# Patient Record
Sex: MALE | Race: WHITE | NOT HISPANIC OR LATINO | Employment: FULL TIME | URBAN - METROPOLITAN AREA
[De-identification: names, ages, dates, MRNs, and addresses within clinical notes are randomized per-mention and may not be internally consistent; named-entity substitution may affect disease eponyms.]

---

## 2021-12-21 ENCOUNTER — HISTORICAL (OUTPATIENT)
Dept: ADMINISTRATIVE | Facility: HOSPITAL | Age: 51
End: 2021-12-21

## 2022-08-15 ENCOUNTER — OFFICE VISIT (OUTPATIENT)
Dept: URGENT CARE | Facility: CLINIC | Age: 52
End: 2022-08-15

## 2022-08-15 VITALS
OXYGEN SATURATION: 97 % | BODY MASS INDEX: 27.44 KG/M2 | RESPIRATION RATE: 18 BRPM | HEIGHT: 71 IN | WEIGHT: 196 LBS | HEART RATE: 89 BPM | DIASTOLIC BLOOD PRESSURE: 85 MMHG | SYSTOLIC BLOOD PRESSURE: 124 MMHG | TEMPERATURE: 99 F

## 2022-08-15 DIAGNOSIS — J02.0 STREP PHARYNGITIS: ICD-10-CM

## 2022-08-15 DIAGNOSIS — J02.9 SORE THROAT: Primary | ICD-10-CM

## 2022-08-15 LAB
CTP QC/QA: YES
S PYO RRNA THROAT QL PROBE: POSITIVE

## 2022-08-15 PROCEDURE — 87880 STREP A ASSAY W/OPTIC: CPT | Mod: QW,,, | Performed by: PHYSICIAN ASSISTANT

## 2022-08-15 PROCEDURE — 99203 PR OFFICE/OUTPT VISIT, NEW, LEVL III, 30-44 MIN: ICD-10-PCS | Mod: 25,,, | Performed by: PHYSICIAN ASSISTANT

## 2022-08-15 PROCEDURE — 99203 OFFICE O/P NEW LOW 30 MIN: CPT | Mod: 25,,, | Performed by: PHYSICIAN ASSISTANT

## 2022-08-15 PROCEDURE — 87880 POCT RAPID STREP A: ICD-10-PCS | Mod: QW,,, | Performed by: PHYSICIAN ASSISTANT

## 2022-08-15 RX ORDER — AMOXICILLIN 875 MG/1
875 TABLET, FILM COATED ORAL 2 TIMES DAILY
Qty: 20 TABLET | Refills: 0 | Status: SHIPPED | OUTPATIENT
Start: 2022-08-15 | End: 2022-08-25

## 2022-08-15 NOTE — PROGRESS NOTES
"Subjective:       Patient ID: Rah Sotelo is a 52 y.o. male.    Vitals:  height is 5' 10.5" (1.791 m) and weight is 88.9 kg (196 lb). His oral temperature is 98.5 °F (36.9 °C). His blood pressure is 124/85 and his pulse is 89. His respiration is 18 and oxygen saturation is 97%.     Chief Complaint: tongue irritation    Tongue and throat irritation, red bumps on palate of mouth x1 week.  Reports mild nasal congestion but denies any fever cough neck stiffness rash or GI symptoms.    ROS    Objective:      Physical Exam   Constitutional: He is oriented to person, place, and time. He appears well-developed. He is cooperative.  Non-toxic appearance. He does not appear ill. No distress.   HENT:   Head: Normocephalic and atraumatic.   Ears:   Right Ear: Hearing, tympanic membrane, external ear and ear canal normal.   Left Ear: Hearing, tympanic membrane, external ear and ear canal normal.   Nose: Nose normal. No mucosal edema, rhinorrhea or nasal deformity. No epistaxis. Right sinus exhibits no maxillary sinus tenderness and no frontal sinus tenderness. Left sinus exhibits no maxillary sinus tenderness and no frontal sinus tenderness.   Mouth/Throat: Uvula is midline and mucous membranes are normal. No trismus in the jaw. Normal dentition. No uvula swelling. Posterior oropharyngeal erythema present. No oropharyngeal exudate or posterior oropharyngeal edema.   Eyes: Conjunctivae and lids are normal. No scleral icterus.   Neck: Trachea normal and phonation normal. Neck supple. No edema present. No erythema present. No neck rigidity present.   Cardiovascular: Normal rate, regular rhythm, normal heart sounds and normal pulses.   Pulmonary/Chest: Effort normal and breath sounds normal. No respiratory distress. He has no decreased breath sounds. He has no rhonchi.   Abdominal: Normal appearance.   Musculoskeletal: Normal range of motion.         General: No deformity. Normal range of motion.   Neurological: He is alert and " "oriented to person, place, and time. He exhibits normal muscle tone. Coordination normal.   Skin: Skin is warm, dry, intact, not diaphoretic and not pale.   Psychiatric: His speech is normal and behavior is normal. Judgment and thought content normal.   Nursing note and vitals reviewed.  Soft palate petechiae present         Previous History      Review of patient's allergies indicates:  No Known Allergies    History reviewed. No pertinent past medical history.  Current Outpatient Medications   Medication Instructions    amoxicillin (AMOXIL) 875 mg, Oral, 2 times daily     Past Surgical History:   Procedure Laterality Date    HERNIA REPAIR       Family History   Family history unknown: Yes       Social History     Tobacco Use    Smoking status: Never Smoker    Smokeless tobacco: Never Used   Substance Use Topics    Alcohol use: Yes    Drug use: Never        Physical Exam      Vital Signs Reviewed   /85   Pulse 89   Temp 98.5 °F (36.9 °C) (Oral)   Resp 18   Ht 5' 10.5" (1.791 m)   Wt 88.9 kg (196 lb)   SpO2 97%   BMI 27.73 kg/m²        Procedures    Procedures     Labs     Results for orders placed or performed in visit on 08/15/22   POCT rapid strep A   Result Value Ref Range    Rapid Strep A Screen Positive (A) Negative     Acceptable Yes          Assessment:       1. Sore throat    2. Strep pharyngitis          Plan:         Sore throat  -     POCT rapid strep A    Strep pharyngitis  -     amoxicillin (AMOXIL) 875 MG tablet; Take 1 tablet (875 mg total) by mouth 2 (two) times daily. for 10 days  Dispense: 20 tablet; Refill: 0    Drink plenty of fluids    Get plenty of rest.    Follow-up with your primary care doctor      Go to emergency department with any significant change or worsening symptoms.    Tylenol or Motrin as needed for fever.                 "

## 2023-11-14 ENCOUNTER — OFFICE VISIT (OUTPATIENT)
Dept: URGENT CARE | Facility: CLINIC | Age: 53
End: 2023-11-14
Payer: COMMERCIAL

## 2023-11-14 VITALS
OXYGEN SATURATION: 98 % | SYSTOLIC BLOOD PRESSURE: 145 MMHG | WEIGHT: 206 LBS | DIASTOLIC BLOOD PRESSURE: 88 MMHG | RESPIRATION RATE: 18 BRPM | BODY MASS INDEX: 29.49 KG/M2 | HEART RATE: 116 BPM | HEIGHT: 70 IN | TEMPERATURE: 98 F

## 2023-11-14 DIAGNOSIS — R50.9 FEVER, UNSPECIFIED FEVER CAUSE: ICD-10-CM

## 2023-11-14 DIAGNOSIS — R68.89 FLU-LIKE SYMPTOMS: Primary | ICD-10-CM

## 2023-11-14 LAB
CTP QC/QA: YES
MOLECULAR STREP A: NEGATIVE
POC MOLECULAR INFLUENZA A AGN: NEGATIVE
POC MOLECULAR INFLUENZA B AGN: NEGATIVE
SARS-COV-2 RDRP RESP QL NAA+PROBE: NEGATIVE

## 2023-11-14 PROCEDURE — 87502 INFLUENZA DNA AMP PROBE: CPT | Mod: QW,,,

## 2023-11-14 PROCEDURE — 99213 PR OFFICE/OUTPT VISIT, EST, LEVL III, 20-29 MIN: ICD-10-PCS | Mod: ,,,

## 2023-11-14 PROCEDURE — 99213 OFFICE O/P EST LOW 20 MIN: CPT | Mod: ,,,

## 2023-11-14 PROCEDURE — 87651 POCT STREP A MOLECULAR: ICD-10-PCS | Mod: QW,,,

## 2023-11-14 PROCEDURE — 87651 STREP A DNA AMP PROBE: CPT | Mod: QW,,,

## 2023-11-14 PROCEDURE — 87635: ICD-10-PCS | Mod: QW,,,

## 2023-11-14 PROCEDURE — 87502 POCT INFLUENZA A/B MOLECULAR: ICD-10-PCS | Mod: QW,,,

## 2023-11-14 PROCEDURE — 87635 SARS-COV-2 COVID-19 AMP PRB: CPT | Mod: QW,,,

## 2023-11-14 RX ORDER — OSELTAMIVIR PHOSPHATE 75 MG/1
75 CAPSULE ORAL 2 TIMES DAILY
Qty: 10 CAPSULE | Refills: 0 | Status: SHIPPED | OUTPATIENT
Start: 2023-11-14 | End: 2023-11-14 | Stop reason: SDUPTHER

## 2023-11-14 RX ORDER — OSELTAMIVIR PHOSPHATE 75 MG/1
75 CAPSULE ORAL 2 TIMES DAILY
Qty: 10 CAPSULE | Refills: 0 | Status: SHIPPED | OUTPATIENT
Start: 2023-11-14 | End: 2023-11-19

## 2023-11-14 NOTE — PROGRESS NOTES
"Subjective:      Patient ID: Rah Sotelo is a 53 y.o. male.    Vitals:  height is 5' 10" (1.778 m) and weight is 93.4 kg (206 lb). His temperature is 98.2 °F (36.8 °C). His blood pressure is 145/88 (abnormal) and his pulse is 116 (abnormal). His respiration is 18 and oxygen saturation is 98%.     Chief Complaint: Generalized Body Aches (Patient presents to the clinic today with complaints of fever,body aches, chills, minor sore throat. Negative @home test taken today.  Exposed to covid)    A 54 y/o male presents to the clinic with c/o body aches, fever tmax 100.4, chills and sore throat starting this afternoon. He denies any hx of asthma, wheezing, sob, cp, n/v/d, abdominal complaints, rash, difficulty swallowing, neck stiffness, or changes in intake or output.           Constitution: Positive for chills and fever. Negative for fatigue.   HENT:  Positive for congestion and sore throat. Negative for trouble swallowing and voice change.    Neck: neck negative.   Eyes: Negative.    Respiratory:  Negative for shortness of breath, wheezing and asthma.    Allergic/Immunologic: Negative for asthma.      Objective:     Physical Exam   Constitutional: He is oriented to person, place, and time. He appears well-developed. He is cooperative.  Non-toxic appearance. He does not appear ill. No distress.   HENT:   Head: Normocephalic and atraumatic.   Ears:   Right Ear: Hearing, tympanic membrane and external ear normal.   Left Ear: Hearing, tympanic membrane and external ear normal.   Nose: Congestion present.   Mouth/Throat: Mucous membranes are normal. Mucous membranes are moist. Posterior oropharyngeal erythema present. Oropharynx is clear.   Eyes: Conjunctivae and lids are normal.   Neck: Trachea normal and phonation normal. Neck supple. No edema present. No erythema present. No neck rigidity present.   Cardiovascular: Normal rate, regular rhythm and normal heart sounds.   Pulmonary/Chest: Effort normal and breath sounds " "normal. No stridor. No respiratory distress. He has no decreased breath sounds. He has no wheezes. He has no rhonchi. He has no rales.   Abdominal: Normal appearance.   Neurological: He is alert and oriented to person, place, and time. He exhibits normal muscle tone.   Skin: Skin is warm, intact and not diaphoretic. Capillary refill takes less than 2 seconds.   Psychiatric: His speech is normal and behavior is normal. Mood normal.   Nursing note and vitals reviewed.         Previous History      Review of patient's allergies indicates:  No Known Allergies    Past Medical History:   Diagnosis Date    Known health problems: none      Current Outpatient Medications   Medication Instructions    oseltamivir (TAMIFLU) 75 mg, Oral, 2 times daily     Past Surgical History:   Procedure Laterality Date    HERNIA REPAIR       Family History   Problem Relation Age of Onset    Heart disease Mother     Aneurysm Father     Thyroid disease Maternal Grandmother     Heart attack Maternal Grandfather        Social History     Tobacco Use    Smoking status: Never     Passive exposure: Never    Smokeless tobacco: Never   Substance Use Topics    Alcohol use: Yes    Drug use: Never        Physical Exam      Vital Signs Reviewed   BP (!) 145/88   Pulse (!) 116   Temp 98.2 °F (36.8 °C)   Resp 18   Ht 5' 10" (1.778 m)   Wt 93.4 kg (206 lb)   SpO2 98%   BMI 29.56 kg/m²        Procedures    Procedures     Labs     Results for orders placed or performed in visit on 11/14/23   POCT Influenza A/B Molecular   Result Value Ref Range    POC Molecular Influenza A Ag Negative Negative, Not Reported    POC Molecular Influenza B Ag Negative Negative, Not Reported     Acceptable Yes    POCT Strep A, Molecular   Result Value Ref Range    Molecular Strep A, POC Negative Negative     Acceptable Yes    POCT COVID-19 Rapid Screening   Result Value Ref Range    POC Rapid COVID Negative Negative     Acceptable " Yes        Assessment:     1. Fever, unspecified fever cause    2. Flu-like symptoms        Plan:       Fever, unspecified fever cause  -     POCT Influenza A/B Molecular  -     POCT Strep A, Molecular  -     POCT COVID-19 Rapid Screening    Flu-like symptoms    Other orders  -     oseltamivir (TAMIFLU) 75 MG capsule; Take 1 capsule (75 mg total) by mouth 2 (two) times daily. for 5 days  Dispense: 10 capsule; Refill: 0    Prescriptions called in to pharmacy     As discussed, it may have been too early to test, if symptoms persist or worsen over the next 1-2 days you may return to be re swabbed as a nurse visit.   Increase fluid intake. Monitor for fever. Take tylenol/acetaminophen or advil/ibuprofen as needed for headache, bodyaches or fever.   Treat your symptoms like the common cold, take Delysm/dimetapp/robitussin as needed for cough, claritin, flonase, mucinex for congestion, for example.   Complications for flu include pneumonia, bronchitis, and sinusitis.   Stay home for 5 to 7 days total starting from when your symptoms began.  If your symptoms worsen, or you develop shortness of breath, worsening of cough, or fever over 102.5, seek medical attention immediately.

## 2023-11-14 NOTE — PATIENT INSTRUCTIONS
Prescriptions called in to pharmacy     As discussed, it may have been too early to test, if symptoms persist or worsen over the next 1-2 days you may return to be re swabbed as a nurse visit.   Increase fluid intake. Monitor for fever. Take tylenol/acetaminophen or advil/ibuprofen as needed for headache, bodyaches or fever.   Treat your symptoms like the common cold, take Delysm/dimetapp/robitussin as needed for cough, claritin, flonase, mucinex for congestion, for example.   Complications for flu include pneumonia, bronchitis, and sinusitis.   Stay home for 5 to 7 days total starting from when your symptoms began.  If your symptoms worsen, or you develop shortness of breath, worsening of cough, or fever over 102.5, seek medical attention immediately.

## 2023-11-15 ENCOUNTER — CLINICAL SUPPORT (OUTPATIENT)
Dept: URGENT CARE | Facility: CLINIC | Age: 53
End: 2023-11-15
Payer: COMMERCIAL

## 2023-11-15 DIAGNOSIS — R50.9 FEVER, UNSPECIFIED FEVER CAUSE: Primary | ICD-10-CM

## 2023-11-15 LAB
CTP QC/QA: YES
CTP QC/QA: YES
POC MOLECULAR INFLUENZA A AGN: NEGATIVE
POC MOLECULAR INFLUENZA B AGN: NEGATIVE
SARS-COV-2 RDRP RESP QL NAA+PROBE: NEGATIVE

## 2023-11-15 PROCEDURE — 87502 POCT INFLUENZA A/B MOLECULAR: ICD-10-PCS | Mod: QW,,,

## 2023-11-15 PROCEDURE — 87502 INFLUENZA DNA AMP PROBE: CPT | Mod: QW,,,

## 2023-11-15 PROCEDURE — 87635: ICD-10-PCS | Mod: QW,,,

## 2023-11-15 PROCEDURE — 87635 SARS-COV-2 COVID-19 AMP PRB: CPT | Mod: QW,,,

## 2023-11-15 NOTE — PROGRESS NOTES
Patient returned to clinic to be reswabbed for flu and covid, swabs obtained and test ran. Patient notified and verbalized understanding.